# Patient Record
Sex: MALE | Race: BLACK OR AFRICAN AMERICAN | NOT HISPANIC OR LATINO
[De-identification: names, ages, dates, MRNs, and addresses within clinical notes are randomized per-mention and may not be internally consistent; named-entity substitution may affect disease eponyms.]

---

## 2021-01-01 ENCOUNTER — APPOINTMENT (OUTPATIENT)
Dept: CARDIOLOGY | Facility: CLINIC | Age: 86
End: 2021-01-01

## 2021-01-01 ENCOUNTER — APPOINTMENT (OUTPATIENT)
Dept: CARDIOLOGY | Facility: CLINIC | Age: 86
End: 2021-01-01
Payer: MEDICARE

## 2021-01-01 ENCOUNTER — NON-APPOINTMENT (OUTPATIENT)
Age: 86
End: 2021-01-01

## 2021-01-01 VITALS
HEIGHT: 70 IN | HEART RATE: 65 BPM | BODY MASS INDEX: 24.77 KG/M2 | SYSTOLIC BLOOD PRESSURE: 132 MMHG | OXYGEN SATURATION: 99 % | WEIGHT: 173 LBS | DIASTOLIC BLOOD PRESSURE: 58 MMHG | TEMPERATURE: 97.3 F

## 2021-01-01 VITALS
DIASTOLIC BLOOD PRESSURE: 62 MMHG | HEART RATE: 63 BPM | HEIGHT: 70 IN | OXYGEN SATURATION: 96 % | SYSTOLIC BLOOD PRESSURE: 128 MMHG

## 2021-01-01 VITALS — SYSTOLIC BLOOD PRESSURE: 118 MMHG | DIASTOLIC BLOOD PRESSURE: 62 MMHG

## 2021-01-01 DIAGNOSIS — Z82.3 FAMILY HISTORY OF STROKE: ICD-10-CM

## 2021-01-01 DIAGNOSIS — Z78.9 OTHER SPECIFIED HEALTH STATUS: ICD-10-CM

## 2021-01-01 DIAGNOSIS — Z82.49 FAMILY HISTORY OF ISCHEMIC HEART DISEASE AND OTHER DISEASES OF THE CIRCULATORY SYSTEM: ICD-10-CM

## 2021-01-01 PROCEDURE — 93306 TTE W/DOPPLER COMPLETE: CPT

## 2021-01-01 PROCEDURE — 99214 OFFICE O/P EST MOD 30 MIN: CPT

## 2021-01-01 PROCEDURE — 99204 OFFICE O/P NEW MOD 45 MIN: CPT

## 2021-01-01 RX ORDER — MAGNESIUM HYDROXIDE 400 MG/5ML
400 SUSPENSION ORAL
Refills: 0 | Status: ACTIVE | COMMUNITY

## 2021-01-01 RX ORDER — QUETIAPINE FUMARATE 25 MG/1
25 TABLET ORAL
Qty: 30 | Refills: 0 | Status: ACTIVE | COMMUNITY
Start: 2021-01-01

## 2021-01-01 RX ORDER — ATORVASTATIN CALCIUM 40 MG/1
40 TABLET, FILM COATED ORAL DAILY
Refills: 0 | Status: ACTIVE | COMMUNITY
Start: 2021-01-01

## 2021-01-01 RX ORDER — ASPIRIN 81 MG
81 TABLET, DELAYED RELEASE (ENTERIC COATED) ORAL DAILY
Refills: 0 | Status: ACTIVE | COMMUNITY

## 2021-01-01 RX ORDER — CARVEDILOL 12.5 MG/1
12.5 TABLET, FILM COATED ORAL TWICE DAILY
Qty: 90 | Refills: 0 | Status: DISCONTINUED | COMMUNITY
Start: 2021-01-01 | End: 2021-01-01

## 2021-01-01 RX ORDER — AMLODIPINE BESYLATE 5 MG/1
5 TABLET ORAL DAILY
Qty: 90 | Refills: 3 | Status: ACTIVE | COMMUNITY
Start: 2021-01-01

## 2021-10-21 PROBLEM — Z00.00 ENCOUNTER FOR PREVENTIVE HEALTH EXAMINATION: Status: ACTIVE | Noted: 2021-01-01

## 2021-10-26 PROBLEM — Z78.9 CAFFEINE USE: Status: ACTIVE | Noted: 2021-01-01

## 2021-10-26 PROBLEM — Z82.3 FAMILY HISTORY OF CEREBROVASCULAR ACCIDENT (CVA): Status: ACTIVE | Noted: 2021-01-01

## 2021-10-26 PROBLEM — Z82.49 FAMILY HISTORY OF HYPERTENSION: Status: ACTIVE | Noted: 2021-01-01

## 2021-10-26 NOTE — ASSESSMENT
[FreeTextEntry1] : ECG was reviewed.  Sinus bradycardia.  Wenckebach.  No symptoms related to sinus bradycardia.  Zio patch will be done for a week to rule out any significant bradycardia.  Echocardiogram to evaluate ejection fraction.  Follow-up after testing.  No indication for pacemaker at this time.  If he develops any symptoms related to bradycardia he should go to emergency room.

## 2021-10-26 NOTE — HISTORY OF PRESENT ILLNESS
[FreeTextEntry1] : And is presenting here today for cardiac evaluation.  He was found to have bradycardia on recent ECG.  He denies any dizziness lightheadedness.  No syncope or near syncope.  No chest pains or any shortness of breath.

## 2021-11-09 NOTE — ASSESSMENT
[FreeTextEntry1] : ECG was reviewed.  Sinus bradycardia.  Wenckebach.  No symptoms related to sinus bradycardia.  Zio patch will be done for a week to rule out any significant bradycardia.  Echocardiogram was reviewed.  Moderately reduced left ventricular function.  No significant valvulopathy.  There is no symptom or sign of fluid overload.  Continue low-dose beta-blocker.  Zio patch results are pending.  He reports no symptoms or signs related to bradycardia.  No history of syncope or near syncope.  Cardiac follow-up 6 months and as needed.  The monitor results will be reviewed and addressed.

## 2022-01-01 ENCOUNTER — NON-APPOINTMENT (OUTPATIENT)
Age: 87
End: 2022-01-01

## 2022-01-01 ENCOUNTER — APPOINTMENT (OUTPATIENT)
Dept: ELECTROPHYSIOLOGY | Facility: CLINIC | Age: 87
End: 2022-01-01
Payer: MEDICARE

## 2022-01-01 ENCOUNTER — APPOINTMENT (OUTPATIENT)
Dept: CARDIOLOGY | Facility: CLINIC | Age: 87
End: 2022-01-01
Payer: MEDICARE

## 2022-01-01 ENCOUNTER — APPOINTMENT (OUTPATIENT)
Dept: CARDIOLOGY | Facility: CLINIC | Age: 87
End: 2022-01-01

## 2022-01-01 VITALS
HEIGHT: 70 IN | DIASTOLIC BLOOD PRESSURE: 70 MMHG | SYSTOLIC BLOOD PRESSURE: 118 MMHG | BODY MASS INDEX: 23.34 KG/M2 | WEIGHT: 163 LBS

## 2022-01-01 VITALS
WEIGHT: 173 LBS | BODY MASS INDEX: 24.77 KG/M2 | OXYGEN SATURATION: 98 % | DIASTOLIC BLOOD PRESSURE: 72 MMHG | HEIGHT: 70 IN | HEART RATE: 70 BPM | SYSTOLIC BLOOD PRESSURE: 126 MMHG | TEMPERATURE: 97.3 F

## 2022-01-01 DIAGNOSIS — I44.39 OTHER ATRIOVENTRICULAR BLOCK: ICD-10-CM

## 2022-01-01 PROCEDURE — 99214 OFFICE O/P EST MOD 30 MIN: CPT

## 2022-01-01 PROCEDURE — 93000 ELECTROCARDIOGRAM COMPLETE: CPT

## 2022-02-26 NOTE — DISCUSSION/SUMMARY
[FreeTextEntry1] : He is currently off AV blockers and continues to have junctional rhythm with rates currently 60-70.  The patient is not ambulatory and has not had syncope.\par \par I discussed the options with his care provider.  He meets indication for pacing but not clear whether they want to have this done.  Will discuss with his cardiologist as well as the care team.  There was no family available for discussion.  Patient is able to interact and does not want a pacemaker.

## 2022-02-26 NOTE — PHYSICAL EXAM
[No Acute Distress] : no acute distress [Normal Conjunctiva] : normal conjunctiva [Normal Venous Pressure] : normal venous pressure [Clear Lung Fields] : clear lung fields [Non Tender] : non-tender [Cognitive Impairment] : cognitive impairment [de-identified] : Regular rate:

## 2022-02-26 NOTE — CARDIOLOGY SUMMARY
[de-identified] : Junctional    Rhythm \par -Nonspecific ST depression   +   Nonspecific T-abnormality  -\par

## 2022-02-26 NOTE — HISTORY OF PRESENT ILLNESS
[FreeTextEntry1] : Patient is a 89-year-old man who was brought in from [facility] by his care team because of prior bradycardia and discussion regarding potential need for pacemaker.  He is wheelchair-bound.  Patient was not able to answer questions or offer any complaints.  According to the team he has not had syncope.\par The patient had initial Zio patch monitor for 7 days 10/26/2021 that showed pauses and periods of complete heart block.\par He has had a prior Zio patch monitor placed for 2 days 12/10/2021: Showed AV block with junctional rhythm and rates as low as 23 bpm.  He was previously on beta-blocker and it was discontinued.\par \par His EKG today showed junctional rhythm with rates 70 bpm.\par \par He had an echocardiogram performed 11/9/2021 that showed EF 35 to 40%.

## 2022-06-07 PROBLEM — I44.39 HIGH-GRADE ATRIOVENTRICULAR BLOCK: Status: ACTIVE | Noted: 2022-01-01

## 2022-06-07 NOTE — HISTORY OF PRESENT ILLNESS
[FreeTextEntry1] : Patient is a 89-year-old man who was brought in from [facility] by his care team because of prior bradycardia and discussion regarding potential need for pacemaker.  He is wheelchair-bound.  Patient was not able to answer questions or offer any complaints.  According to the team he has not had syncope.\par The patient had initial Zio patch monitor for 7 days 10/26/2021 that showed pauses and periods of complete heart block.\par He has had a prior Zio patch monitor placed for 2 days 12/10/2021: Showed AV block with junctional rhythm and rates as low as 23 bpm.  He was previously on beta-blocker and it was discontinued.\par \par His EKG today showed junctional rhythm with rates 70 bpm.\par \par He had an echocardiogram performed 11/9/2021 that showed EF 35 to 40%.

## 2022-06-07 NOTE — PHYSICAL EXAM
[No Acute Distress] : no acute distress [Normal Conjunctiva] : normal conjunctiva [Normal Venous Pressure] : normal venous pressure [Clear Lung Fields] : clear lung fields [Non Tender] : non-tender [Cognitive Impairment] : cognitive impairment [de-identified] : Regular rate:

## 2022-06-07 NOTE — DISCUSSION/SUMMARY
[FreeTextEntry1] : He is currently off AV blockers and continues to have junctional rhythm with rates currently 60-70.  The patient is not ambulatory and has not had syncope.\par \par Follow up with EP . Patient is able to interact and does not want a pacemaker.

## 2022-06-07 NOTE — CARDIOLOGY SUMMARY
[de-identified] : Junctional    Rhythm \par -Nonspecific ST depression   +   Nonspecific T-abnormality  -\par

## 2022-12-06 PROBLEM — I10 HTN (HYPERTENSION): Status: ACTIVE | Noted: 2021-01-01

## 2022-12-06 PROBLEM — I44.0 FIRST DEGREE AV BLOCK: Status: ACTIVE | Noted: 2021-01-01

## 2022-12-06 PROBLEM — R94.31 ABNORMAL EKG: Status: ACTIVE | Noted: 2021-01-01

## 2022-12-06 PROBLEM — R00.1 BRADYCARDIA: Status: ACTIVE | Noted: 2021-01-01

## 2022-12-07 ENCOUNTER — APPOINTMENT (OUTPATIENT)
Dept: CARDIOLOGY | Facility: CLINIC | Age: 87
End: 2022-12-07

## 2022-12-07 DIAGNOSIS — I44.0 ATRIOVENTRICULAR BLOCK, FIRST DEGREE: ICD-10-CM

## 2022-12-07 DIAGNOSIS — R94.31 ABNORMAL ELECTROCARDIOGRAM [ECG] [EKG]: ICD-10-CM

## 2022-12-07 DIAGNOSIS — R00.1 BRADYCARDIA, UNSPECIFIED: ICD-10-CM

## 2022-12-07 DIAGNOSIS — I10 ESSENTIAL (PRIMARY) HYPERTENSION: ICD-10-CM
